# Patient Record
Sex: FEMALE | Race: OTHER | NOT HISPANIC OR LATINO | Employment: STUDENT | URBAN - NONMETROPOLITAN AREA
[De-identification: names, ages, dates, MRNs, and addresses within clinical notes are randomized per-mention and may not be internally consistent; named-entity substitution may affect disease eponyms.]

---
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
## 2025-01-14 DIAGNOSIS — E11.65 TYPE 2 DIABETES MELLITUS WITH HYPERGLYCEMIA, WITH LONG-TERM CURRENT USE OF INSULIN: ICD-10-CM

## 2025-01-14 DIAGNOSIS — Z79.4 TYPE 2 DIABETES MELLITUS WITH HYPERGLYCEMIA, WITH LONG-TERM CURRENT USE OF INSULIN: ICD-10-CM

## 2025-01-14 RX ORDER — INSULIN GLARGINE 100 [IU]/ML
INJECTION, SOLUTION SUBCUTANEOUS
Qty: 30 ML | Refills: 3 | Status: SHIPPED | OUTPATIENT
Start: 2025-01-14

## 2025-02-03 ENCOUNTER — TELEPHONE (OUTPATIENT)
Dept: PRIMARY CARE | Facility: CLINIC | Age: 20
End: 2025-02-03

## 2025-02-03 DIAGNOSIS — Z79.4 TYPE 2 DIABETES MELLITUS WITH HYPERGLYCEMIA, WITH LONG-TERM CURRENT USE OF INSULIN: ICD-10-CM

## 2025-02-03 DIAGNOSIS — E11.65 TYPE 2 DIABETES MELLITUS WITH HYPERGLYCEMIA, WITH LONG-TERM CURRENT USE OF INSULIN: ICD-10-CM

## 2025-02-04 RX ORDER — DULAGLUTIDE 1.5 MG/.5ML
1.5 INJECTION, SOLUTION SUBCUTANEOUS WEEKLY
Qty: 4 ML | Refills: 1 | Status: SHIPPED | OUTPATIENT
Start: 2025-02-04

## 2025-04-02 ENCOUNTER — APPOINTMENT (OUTPATIENT)
Dept: PRIMARY CARE | Facility: CLINIC | Age: 20
End: 2025-04-02
Payer: MEDICAID

## 2025-04-02 VITALS
HEART RATE: 93 BPM | SYSTOLIC BLOOD PRESSURE: 142 MMHG | WEIGHT: 293 LBS | HEIGHT: 70 IN | OXYGEN SATURATION: 96 % | BODY MASS INDEX: 41.95 KG/M2 | DIASTOLIC BLOOD PRESSURE: 101 MMHG

## 2025-04-02 DIAGNOSIS — E11.00 TYPE 2 DIABETES MELLITUS WITH HYPEROSMOLARITY WITHOUT COMA, UNSPECIFIED WHETHER LONG TERM INSULIN USE (MULTI): ICD-10-CM

## 2025-04-02 DIAGNOSIS — E11.65 TYPE 2 DIABETES MELLITUS WITH HYPERGLYCEMIA, WITH LONG-TERM CURRENT USE OF INSULIN: ICD-10-CM

## 2025-04-02 DIAGNOSIS — E61.1 IRON DEFICIENCY: ICD-10-CM

## 2025-04-02 DIAGNOSIS — E66.01 SEVERE OBESITY DUE TO EXCESS CALORIES WITH SERIOUS COMORBIDITY AND BODY MASS INDEX (BMI) 120% OF 95TH PERCENTILE TO LESS THAN 140% OF 95TH PERCENTILE FOR AGE IN PEDIATRIC PATIENT: ICD-10-CM

## 2025-04-02 DIAGNOSIS — E55.9 VITAMIN D DEFICIENCY: ICD-10-CM

## 2025-04-02 DIAGNOSIS — I10 PRIMARY HYPERTENSION: ICD-10-CM

## 2025-04-02 DIAGNOSIS — J45.40 MODERATE PERSISTENT REACTIVE AIRWAY DISEASE WITHOUT COMPLICATION (HHS-HCC): Primary | ICD-10-CM

## 2025-04-02 DIAGNOSIS — R00.2 HEART PALPITATIONS: ICD-10-CM

## 2025-04-02 DIAGNOSIS — Z79.4 TYPE 2 DIABETES MELLITUS WITH HYPERGLYCEMIA, WITH LONG-TERM CURRENT USE OF INSULIN: ICD-10-CM

## 2025-04-02 DIAGNOSIS — Z68.55 SEVERE OBESITY DUE TO EXCESS CALORIES WITH SERIOUS COMORBIDITY AND BODY MASS INDEX (BMI) 120% OF 95TH PERCENTILE TO LESS THAN 140% OF 95TH PERCENTILE FOR AGE IN PEDIATRIC PATIENT: ICD-10-CM

## 2025-04-02 PROCEDURE — 99214 OFFICE O/P EST MOD 30 MIN: CPT

## 2025-04-02 PROCEDURE — 3080F DIAST BP >= 90 MM HG: CPT

## 2025-04-02 PROCEDURE — 3077F SYST BP >= 140 MM HG: CPT

## 2025-04-02 PROCEDURE — 3008F BODY MASS INDEX DOCD: CPT

## 2025-04-02 PROCEDURE — 1036F TOBACCO NON-USER: CPT

## 2025-04-02 RX ORDER — DULAGLUTIDE 1.5 MG/.5ML
1.5 INJECTION, SOLUTION SUBCUTANEOUS WEEKLY
Qty: 4 ML | Refills: 3 | Status: CANCELLED | OUTPATIENT
Start: 2025-04-02

## 2025-04-02 RX ORDER — PROPRANOLOL HYDROCHLORIDE 60 MG/1
60 CAPSULE, EXTENDED RELEASE ORAL DAILY
Qty: 90 CAPSULE | Refills: 3 | Status: SHIPPED | OUTPATIENT
Start: 2025-04-02 | End: 2026-04-02

## 2025-04-02 RX ORDER — ERGOCALCIFEROL 1.25 MG/1
50000 CAPSULE ORAL
Qty: 12 CAPSULE | Refills: 0 | Status: SHIPPED | OUTPATIENT
Start: 2025-04-06 | End: 2026-04-06

## 2025-04-02 RX ORDER — PROPRANOLOL HYDROCHLORIDE 60 MG/1
60 CAPSULE, EXTENDED RELEASE ORAL DAILY
Qty: 30 CAPSULE | Refills: 11 | Status: SHIPPED | OUTPATIENT
Start: 2025-04-02 | End: 2025-04-02

## 2025-04-02 ASSESSMENT — ENCOUNTER SYMPTOMS
HEMATOLOGIC/LYMPHATIC NEGATIVE: 1
GASTROINTESTINAL NEGATIVE: 1
MUSCULOSKELETAL NEGATIVE: 1
PSYCHIATRIC NEGATIVE: 1
PALPITATIONS: 1
RESPIRATORY NEGATIVE: 1
HEADACHES: 1
CONSTITUTIONAL NEGATIVE: 1
ALLERGIC/IMMUNOLOGIC NEGATIVE: 1

## 2025-04-02 ASSESSMENT — PAIN SCALES - GENERAL: PAINLEVEL_OUTOF10: 0-NO PAIN

## 2025-04-02 NOTE — PROGRESS NOTES
"Subjective   Patient ID: Nilton Philip is a 19 y.o. female who presents for Diabetes.  Today she is accompanied by alone.     HPI  Nilton is here for a follow up appointment    Hypertension  - /101  - she reports it has been high every time she gets it checked.  - reports under a lot of stress  - has anxiety and headaches, palpations  - start Inderal La 60 mg    Asthma  - stable, denied SOB, rarely using inhaler  - she is vaping nicotine and marijuana   - current medications: Albuterol inhaler    DM type 2  - A1c  7.9 Three days ago  - last A1c was 8.1 (11/11/24)  - current medications: Trulicity 1.5 mg weekly, Lantus 50 units nightly, Novolog 5 units before each meal if glucose over 150   - used CGM  - statin: Lipitor 20 mg  - increase Trulicity to 3 mg/ weekly  - follow healthy well balanced diet, increase exercise  - works out 5 x week at gym    Obesity  - Weight 305Lb, BMI 43.76  - has been stressed  - work on diet and increase exercise    Vit d def  Iron def  - last labs Vit D 23 (11/24), iron 33 with saturation 9 % (11/24)  - current medications: Vit D 50,000 units/ weeksly, ferrous sulfate 325 mg TiD with meals ( does miss some doses)  - labs ordered    Chest Fluttering  - having fluttering sensation when laying down at night  - has a lot of stress  - denied any SOB, nausea, Chest pain  - CT calcium score ordered  - EKG ordered    Review of Systems   Constitutional: Negative.    HENT:  Positive for dental problem (needs wisdom teeth pulled).    Respiratory: Negative.     Cardiovascular:  Positive for palpitations.   Gastrointestinal: Negative.    Genitourinary: Negative.    Musculoskeletal: Negative.    Skin: Negative.    Allergic/Immunologic: Negative.    Neurological:  Positive for headaches (back of head).   Hematological: Negative.    Psychiatric/Behavioral: Negative.         Objective   BP (!) 142/101 (BP Location: Right arm)   Pulse 93   Ht 1.778 m (5' 10\")   Wt 138 kg (305 lb)   SpO2 96%   " "BMI 43.76 kg/m²   BSA: 2.61 meters squared  Growth percentiles: 99 %ile (Z= 2.25) based on [facility] (Girls, 2-20 Years) Stature-for-age data based on Stature recorded on 4/2/2025. >99 %ile (Z= 2.84) based on [facility] (Girls, 2-20 Years) weight-for-age data using data from 4/2/2025.     Physical Exam  Vitals and nursing note reviewed.   Constitutional:       General: She is not in acute distress.     Appearance: Normal appearance. She is obese.   HENT:      Head: Normocephalic.      Nose: Nose normal.      Mouth/Throat:      Mouth: Mucous membranes are moist.      Pharynx: Oropharynx is clear.   Eyes:      Extraocular Movements: Extraocular movements intact.      Conjunctiva/sclera: Conjunctivae normal.      Pupils: Pupils are equal, round, and reactive to light.   Cardiovascular:      Rate and Rhythm: Normal rate and regular rhythm.      Pulses: Normal pulses.      Heart sounds: Normal heart sounds. No murmur heard.     No gallop.   Pulmonary:      Effort: Pulmonary effort is normal. No respiratory distress.      Breath sounds: Normal breath sounds. No wheezing, rhonchi or rales.   Abdominal:      General: Abdomen is flat. Bowel sounds are normal.      Palpations: Abdomen is soft.   Musculoskeletal:         General: Normal range of motion.      Cervical back: Normal range of motion and neck supple.      Right lower leg: No edema.      Left lower leg: No edema.   Skin:     General: Skin is warm and dry.      Capillary Refill: Capillary refill takes less than 2 seconds.   Neurological:      General: No focal deficit present.      Mental Status: She is alert. Mental status is at baseline.   Psychiatric:         Mood and Affect: Mood normal.         Behavior: Behavior normal.         Thought Content: Thought content normal.         Judgment: Judgment normal.       BP (!) 142/101 (BP Location: Right arm)   Pulse 93   Ht 1.778 m (5' 10\")   Wt 138 kg (305 lb)   SpO2 96%   BMI 43.76 kg/m²    Lab Results   Component Value Date    " GLUCOSE 129 (H) 11/11/2024    CALCIUM 9.2 11/11/2024     11/11/2024    K 4.2 11/11/2024    CO2 27 11/11/2024     11/11/2024    BUN 9 11/11/2024    CREATININE 0.69 11/11/2024        Assessment/Plan   Hypertension  - /101  - start Inderal La 60 mg    Asthma  - she is vaping nicotine and marijuana   - current medications: Albuterol inhaler    DM type 2  - A1c  7.9 Three days ago  - current medications: Trulicity 1.5 mg weekly, Lantus 50 units nightly, Novolog 5 units before each meal if glucose over 150   - using CGM  - statin: Lipitor 20 mg  - increase Trulicity to 3 mg/ weekly  - follow healthy well balanced diet, increase exercise    Obesity  - Weight 305Lb, BMI 43.76  - has been stressed  - work on diet and increase exercise    Vit d def  Iron def  - last labs Vit D 23 (11/24), iron 33 with saturation 9 % (11/24)  - current medications: Vit D 50,000 units/ weeksly, ferrous sulfate 325 mg TiD with meals ( does miss some doses)  - labs ordered    Chest Fluttering  - CT calcium score ordered  - EKG ordered      Problem List Items Addressed This Visit       DMII (diabetes mellitus, type 2) (Multi)    Relevant Medications    dulaglutide (Trulicity) 3 mg/0.5 mL injection    Other Relevant Orders    Vitamin D 25-Hydroxy,Total (for eval of Vitamin D levels)    Iron and TIBC    CBC and Auto Differential    Comprehensive metabolic panel    TSH with reflex to Free T4 if abnormal    ECG 12 lead (Ancillary Performed)    Albumin-Creatinine Ratio, Urine Random    CT cardiac scoring wo IV contrast    Miscellaneous DME    Vitamin D 25-Hydroxy,Total (for eval of Vitamin D levels)    Iron and TIBC    CBC and Auto Differential    Comprehensive metabolic panel    TSH with reflex to Free T4 if abnormal    ECG 12 lead (Ancillary Performed)    Albumin-Creatinine Ratio, Urine Random    Pediatric obesity    Vitamin D deficiency    Relevant Medications    ergocalciferol (Vitamin D-2) 1250 mcg (50,000 units) capsule (Start  on 4/6/2025)    Other Relevant Orders    Vitamin D 25-Hydroxy,Total (for eval of Vitamin D levels)    Moderate persistent reactive airway disease without complication ([facility]) - Primary    Relevant Orders    Vitamin D 25-Hydroxy,Total (for eval of Vitamin D levels)    Iron and TIBC    CBC and Auto Differential    Comprehensive metabolic panel    TSH with reflex to Free T4 if abnormal    ECG 12 lead (Ancillary Performed)    Albumin-Creatinine Ratio, Urine Random     Other Visit Diagnoses       Iron deficiency        Relevant Orders    Iron and TIBC    Primary hypertension        Relevant Medications    propranolol LA (Inderal LA) 60 mg 24 hr capsule    Heart palpitations        Relevant Medications    ergocalciferol (Vitamin D-2) 1250 mcg (50,000 units) capsule (Start on 4/6/2025)    dulaglutide (Trulicity) 3 mg/0.5 mL injection    propranolol LA (Inderal LA) 60 mg 24 hr capsule    Other Relevant Orders    CT cardiac scoring wo IV contrast    Miscellaneous DME    Vitamin D 25-Hydroxy,Total (for eval of Vitamin D levels)    Iron and TIBC    CBC and Auto Differential    Comprehensive metabolic panel    TSH with reflex to Free T4 if abnormal    ECG 12 lead (Ancillary Performed)    Albumin-Creatinine Ratio, Urine Random        It was great to see you today!  Please continue taking your prescribed medications.   Refill have been sent to your pharmacy.    I have ordered some labs to be done as soon as you can.  We will call you with the results.    Please get EKG  Please get CT calcium score  Increase Trulicity to 3 mg  Start Inderal LA 60 mg    RTC in 3 months

## 2025-04-03 DIAGNOSIS — E61.1 IRON DEFICIENCY: Primary | ICD-10-CM

## 2025-04-03 LAB
25(OH)D3+25(OH)D2 SERPL-MCNC: 29 NG/ML (ref 30–100)
ALBUMIN SERPL-MCNC: 3.9 G/DL (ref 3.6–5.1)
ALBUMIN/CREAT UR: 3 MG/G CREAT
ALP SERPL-CCNC: 53 U/L (ref 36–128)
ALT SERPL-CCNC: 19 U/L (ref 5–32)
ANION GAP SERPL CALCULATED.4IONS-SCNC: 6 MMOL/L (CALC) (ref 7–17)
AST SERPL-CCNC: 12 U/L (ref 12–32)
BASOPHILS # BLD AUTO: 51 CELLS/UL (ref 0–200)
BASOPHILS NFR BLD AUTO: 0.5 %
BILIRUB SERPL-MCNC: 0.2 MG/DL (ref 0.2–1.1)
BUN SERPL-MCNC: 8 MG/DL (ref 7–20)
CALCIUM SERPL-MCNC: 9.3 MG/DL (ref 8.9–10.4)
CHLORIDE SERPL-SCNC: 105 MMOL/L (ref 98–110)
CO2 SERPL-SCNC: 27 MMOL/L (ref 20–32)
CREAT SERPL-MCNC: 0.55 MG/DL (ref 0.5–0.96)
CREAT UR-MCNC: 120 MG/DL (ref 20–275)
EGFRCR SERPLBLD CKD-EPI 2021: 135 ML/MIN/1.73M2
EOSINOPHIL # BLD AUTO: 172 CELLS/UL (ref 15–500)
EOSINOPHIL NFR BLD AUTO: 1.7 %
ERYTHROCYTE [DISTWIDTH] IN BLOOD BY AUTOMATED COUNT: 14.7 % (ref 11–15)
GLUCOSE SERPL-MCNC: 129 MG/DL (ref 65–139)
HCT VFR BLD AUTO: 41.7 % (ref 35–45)
HGB BLD-MCNC: 13 G/DL (ref 11.7–15.5)
IRON SATN MFR SERPL: 7 % (CALC) (ref 15–45)
IRON SERPL-MCNC: 25 MCG/DL (ref 27–164)
LYMPHOCYTES # BLD AUTO: 3293 CELLS/UL (ref 850–3900)
LYMPHOCYTES NFR BLD AUTO: 32.6 %
MCH RBC QN AUTO: 23.9 PG (ref 27–33)
MCHC RBC AUTO-ENTMCNC: 31.2 G/DL (ref 32–36)
MCV RBC AUTO: 76.5 FL (ref 80–100)
MICROALBUMIN UR-MCNC: 0.4 MG/DL
MONOCYTES # BLD AUTO: 475 CELLS/UL (ref 200–950)
MONOCYTES NFR BLD AUTO: 4.7 %
NEUTROPHILS # BLD AUTO: 6111 CELLS/UL (ref 1500–7800)
NEUTROPHILS NFR BLD AUTO: 60.5 %
PLATELET # BLD AUTO: 357 THOUSAND/UL (ref 140–400)
PMV BLD REES-ECKER: 10.6 FL (ref 7.5–12.5)
POTASSIUM SERPL-SCNC: 4.8 MMOL/L (ref 3.8–5.1)
PROT SERPL-MCNC: 7.4 G/DL (ref 6.3–8.2)
RBC # BLD AUTO: 5.45 MILLION/UL (ref 3.8–5.1)
SODIUM SERPL-SCNC: 138 MMOL/L (ref 135–146)
TIBC SERPL-MCNC: 363 MCG/DL (CALC) (ref 271–448)
TSH SERPL-ACNC: 2.2 MIU/L
WBC # BLD AUTO: 10.1 THOUSAND/UL (ref 3.8–10.8)

## 2025-04-03 RX ORDER — EPINEPHRINE 0.3 MG/.3ML
0.3 INJECTION SUBCUTANEOUS EVERY 5 MIN PRN
OUTPATIENT
Start: 2025-04-03

## 2025-04-03 RX ORDER — DIPHENHYDRAMINE HYDROCHLORIDE 50 MG/ML
50 INJECTION, SOLUTION INTRAMUSCULAR; INTRAVENOUS AS NEEDED
OUTPATIENT
Start: 2025-04-03

## 2025-04-03 RX ORDER — FAMOTIDINE 10 MG/ML
20 INJECTION, SOLUTION INTRAVENOUS ONCE AS NEEDED
OUTPATIENT
Start: 2025-04-03

## 2025-04-03 RX ORDER — ALBUTEROL SULFATE 0.83 MG/ML
3 SOLUTION RESPIRATORY (INHALATION) AS NEEDED
OUTPATIENT
Start: 2025-04-03

## 2025-04-04 ENCOUNTER — DOCUMENTATION (OUTPATIENT)
Dept: INFUSION THERAPY | Facility: CLINIC | Age: 20
End: 2025-04-04
Payer: MEDICAID

## 2025-04-04 ENCOUNTER — TELEPHONE (OUTPATIENT)
Dept: HEMATOLOGY/ONCOLOGY | Facility: HOSPITAL | Age: 20
End: 2025-04-04
Payer: MEDICAID

## 2025-04-04 NOTE — TELEPHONE ENCOUNTER
Received Venofer 200 mg x 5 doses from patient's PCP, Tati Paige, APRN-CNP. Patient has [facility] Community Plan. Pre-cert pending review. Once patient is scheduled, will email pre-cert to obtain auth. Attempted to reach patient to schedule infusions. Left detailed message, with call back number, for patient to call back at earliest convenience.

## 2025-04-04 NOTE — PROGRESS NOTES
Patient to be scheduled for venofer infusions.     For Diagnosis: Iron Deficiency Anemia    POCT Urine pregnancy test ordered prior to first infusion?  Yes   (If NO please order if female pt <60 years of age and with reproductive capability) )    Review of Labs:  Lab Results   Component Value Date    HGB 13.0 04/02/2025    IRON 25 (L) 04/02/2025    TIBC 363 04/02/2025    MCHC 31.2 (L) 04/02/2025    MCV 76.5 (L) 04/02/2025    MCH 23.9 (L) 04/02/2025      Lab Results   Component Value Date    IRONSAT 7 (L) 04/02/2025        Do labs confirm diagnosis of iron deficiency? Yes    (If NO please reach out to prescribing provider prior to proceeding)      Okay to schedule for treatment as ordered by prescribing provider.

## 2025-04-08 NOTE — TELEPHONE ENCOUNTER
Received Venofer 200 mg x 5 doses from patient's PCP, Tati Paige, APRN-CNP. Reached out to patient x2 to schedule infusions. Patient agreeable. Advised patient provider ordered 5 doses to be infused every 3 days. Patient requested to be scheduled every Wednesday and Sunday. Informed patient we are closed on the weekends and can only schedule Monday through Friday. Patient states she will do weekly Wednesday infusions until complete. Scheduled Venofer 200 mg x 5, starting next Wednesday, 4/16/25 at 2 PM. Patient verbalized understanding and agreement regarding discussed information via verbal feedback.     Patient has [facility] Community Plan. Pre-cert pending review. Email sent to pre-cert to obtain auth.

## 2025-04-16 ENCOUNTER — TELEPHONE (OUTPATIENT)
Dept: HEMATOLOGY/ONCOLOGY | Facility: HOSPITAL | Age: 20
End: 2025-04-16
Payer: MEDICAID

## 2025-04-23 ENCOUNTER — TELEPHONE (OUTPATIENT)
Dept: HEMATOLOGY/ONCOLOGY | Facility: HOSPITAL | Age: 20
End: 2025-04-23
Payer: MEDICAID

## 2025-04-23 DIAGNOSIS — E11.59 TYPE 2 DIABETES MELLITUS WITH OTHER CIRCULATORY COMPLICATION, WITH LONG-TERM CURRENT USE OF INSULIN: ICD-10-CM

## 2025-04-23 DIAGNOSIS — Z79.4 TYPE 2 DIABETES MELLITUS WITH OTHER CIRCULATORY COMPLICATION, WITH LONG-TERM CURRENT USE OF INSULIN: ICD-10-CM

## 2025-04-23 RX ORDER — PEN NEEDLE, DIABETIC 32GX 5/32"
NEEDLE, DISPOSABLE MISCELLANEOUS
Qty: 100 EACH | Refills: 1 | Status: SHIPPED | OUTPATIENT
Start: 2025-04-23

## 2025-04-23 NOTE — TELEPHONE ENCOUNTER
"Reason for Conversation  Appointment    Background   Patient scheduled Venofer infusion today, 4/23 at 2:30 PM. Patient has not shown or checked in for appointment. Attempted to reach patient at 3:09 PM to see if she was on her way or needed to reschedule. Unable to reach patient or leave a message. Per automated message, \"We're sorry but the number you dialed has calling restrictions that have prevented the completion of your call\" then, goes to busy dial tone. Patient is scheduled for next infusion on Wednesday, 4/30/25. If patient no-shows x3, I will reach out again to attempt to reschedule.  "

## 2025-04-29 ENCOUNTER — TRANSCRIBE ORDERS (OUTPATIENT)
Dept: INFUSION THERAPY | Facility: CLINIC | Age: 20
End: 2025-04-29
Payer: MEDICAID

## 2025-04-30 ENCOUNTER — TELEPHONE (OUTPATIENT)
Dept: HEMATOLOGY/ONCOLOGY | Facility: HOSPITAL | Age: 20
End: 2025-04-30
Payer: MEDICAID

## 2025-04-30 NOTE — TELEPHONE ENCOUNTER
Reason for Conversation  No Show and Appointment    Background   Patient was scheduled for Venofer infusion today, 4/30, at 2:30 PM. Patient did not show for the appointment. This is the third no-show, including today's visit. Attempted to contact patient at 3:55 PM to reschedule missed infusion(s). Patient stated she was under the impression that infusions would not begin until May. Informed patient of her next scheduled appointment on Tuesday, 5/6/25 at 2:30 PM. Patient was agreeable and confirmed the appointment. Patient verbalized understanding and agreement regarding the discussed information via verbal feedback.    If patient no-shows the next scheduled appointment, a secure chat will be sent to [facility] to notify the PCP and/or referring provider of failure to follow up.

## 2025-05-05 ENCOUNTER — APPOINTMENT (OUTPATIENT)
Dept: RADIOLOGY | Facility: HOSPITAL | Age: 20
End: 2025-05-05
Payer: MEDICAID

## 2025-05-06 ENCOUNTER — DOCUMENTATION (OUTPATIENT)
Dept: HEMATOLOGY/ONCOLOGY | Facility: HOSPITAL | Age: 20
End: 2025-05-06
Payer: MEDICAID

## 2025-05-06 NOTE — PROGRESS NOTES
Patient was scheduled for Venofer infusion today, 5/6, at 2:30 PM. Patient did not show for the appointment and has now no-showed four consecutive visits on 4/16, 4/23, 4/30, and today, 5/6. As noted in the 4/30 telephone encounter, failure to attend today's visit would prompt notification to the PCP and/or referring provider due to lack of follow-up. Secure chat sent to [facility] to notify appropriate provider of non-compliance with recommended treatment. Cancelled all upcoming appointments with the infusion center.

## 2025-05-07 ENCOUNTER — APPOINTMENT (OUTPATIENT)
Dept: HEMATOLOGY/ONCOLOGY | Facility: HOSPITAL | Age: 20
End: 2025-05-07
Payer: MEDICAID

## 2025-05-14 ENCOUNTER — APPOINTMENT (OUTPATIENT)
Dept: HEMATOLOGY/ONCOLOGY | Facility: HOSPITAL | Age: 20
End: 2025-05-14
Payer: MEDICAID

## 2025-05-21 ENCOUNTER — APPOINTMENT (OUTPATIENT)
Dept: HEMATOLOGY/ONCOLOGY | Facility: HOSPITAL | Age: 20
End: 2025-05-21
Payer: MEDICAID

## 2025-05-28 ENCOUNTER — APPOINTMENT (OUTPATIENT)
Dept: HEMATOLOGY/ONCOLOGY | Facility: HOSPITAL | Age: 20
End: 2025-05-28
Payer: MEDICAID

## 2025-06-03 ENCOUNTER — APPOINTMENT (OUTPATIENT)
Dept: HEMATOLOGY/ONCOLOGY | Facility: HOSPITAL | Age: 20
End: 2025-06-03
Payer: MEDICAID

## 2025-07-02 ENCOUNTER — APPOINTMENT (OUTPATIENT)
Dept: PRIMARY CARE | Facility: CLINIC | Age: 20
End: 2025-07-02
Payer: MEDICAID